# Patient Record
Sex: MALE | Race: WHITE | NOT HISPANIC OR LATINO | Employment: STUDENT | URBAN - METROPOLITAN AREA
[De-identification: names, ages, dates, MRNs, and addresses within clinical notes are randomized per-mention and may not be internally consistent; named-entity substitution may affect disease eponyms.]

---
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
## 2023-01-05 ENCOUNTER — OFFICE VISIT (OUTPATIENT)
Dept: FAMILY MEDICINE CLINIC | Facility: CLINIC | Age: 18
End: 2023-01-05

## 2023-01-05 VITALS
DIASTOLIC BLOOD PRESSURE: 68 MMHG | BODY MASS INDEX: 32.5 KG/M2 | SYSTOLIC BLOOD PRESSURE: 130 MMHG | TEMPERATURE: 97.8 F | HEIGHT: 69 IN | OXYGEN SATURATION: 97 % | HEART RATE: 89 BPM | WEIGHT: 219.4 LBS

## 2023-01-05 DIAGNOSIS — J06.9 UPPER RESPIRATORY TRACT INFECTION, UNSPECIFIED TYPE: Primary | ICD-10-CM

## 2023-01-05 NOTE — PROGRESS NOTES
Assessment/Plan:     Diagnoses and all orders for this visit:    Upper respiratory tract infection, unspecified type  Comments: Will self isolate  COVID test sent to lab  Over-the-counter supportive care  Orders:  -     Covid/Flu- Office Collect          Subjective:      Patient ID: Savanna Grandchild is a 21 y o  male  Patient presents in the office with upper respiratory symptoms since Tuesday  He has cough and congestion  Patient states his chest hurts from coughing  Also has a sore throat  May have had some low-grade fever  Body aches  Nausea or vomiting  Taking over-the-counter cough and cold preps      The following portions of the patient's history were reviewed and updated as appropriate:   He There are no problems to display for this patient  No current outpatient medications on file  No current facility-administered medications for this visit  He has No Known Allergies       Review of Systems   Constitutional: Negative for activity change, appetite change, chills, fatigue and fever  HENT: Positive for congestion and sore throat  Negative for ear pain, postnasal drip, rhinorrhea, sinus pressure, sinus pain and sneezing  Respiratory: Positive for cough  Cardiovascular: Positive for chest pain  Gastrointestinal: Negative for nausea and vomiting  Objective:        Physical Exam  Vitals and nursing note reviewed  Constitutional:       General: He is not in acute distress  Appearance: He is not diaphoretic  HENT:      Head: Normocephalic and atraumatic  Right Ear: Tympanic membrane, ear canal and external ear normal       Left Ear: Tympanic membrane, ear canal and external ear normal       Nose: No rhinorrhea  Right Sinus: No maxillary sinus tenderness or frontal sinus tenderness  Left Sinus: No maxillary sinus tenderness or frontal sinus tenderness  Mouth/Throat:      Pharynx: Posterior oropharyngeal erythema present  No oropharyngeal exudate     Eyes: Conjunctiva/sclera: Conjunctivae normal       Pupils: Pupils are equal, round, and reactive to light  Cardiovascular:      Rate and Rhythm: Normal rate and regular rhythm  Heart sounds: No murmur heard  Pulmonary:      Effort: Pulmonary effort is normal  No respiratory distress  Breath sounds: Normal breath sounds  No wheezing or rales  Lymphadenopathy:      Cervical: No cervical adenopathy  Neurological:      General: No focal deficit present  Mental Status: He is alert and oriented to person, place, and time  Psychiatric:         Mood and Affect: Mood normal          Behavior: Behavior normal          Thought Content:  Thought content normal          Judgment: Judgment normal

## 2023-01-05 NOTE — LETTER
January 5, 2023     Patient: Yamil Walker  YOB: 2005  Date of Visit: 1/5/2023      To Whom it May Concern:    Yamil Walker is under my professional care  Arvind Ajithgodwin was seen in my office on 1/5/2023  Arvind Doyle may return to work on 1/6/2023  If you have any questions or concerns, please don't hesitate to call           Sincerely,          Sandoval Holloway PA-C        CC: No Recipients

## 2023-01-06 LAB
FLUAV RNA RESP QL NAA+PROBE: POSITIVE
FLUBV RNA RESP QL NAA+PROBE: NEGATIVE
SARS-COV-2 RNA RESP QL NAA+PROBE: NEGATIVE

## 2023-02-13 ENCOUNTER — APPOINTMENT (OUTPATIENT)
Dept: RADIOLOGY | Facility: MEDICAL CENTER | Age: 18
End: 2023-02-13

## 2023-02-13 ENCOUNTER — OFFICE VISIT (OUTPATIENT)
Dept: URGENT CARE | Facility: MEDICAL CENTER | Age: 18
End: 2023-02-13

## 2023-02-13 VITALS
TEMPERATURE: 97.7 F | HEART RATE: 74 BPM | BODY MASS INDEX: 32.44 KG/M2 | RESPIRATION RATE: 19 BRPM | SYSTOLIC BLOOD PRESSURE: 112 MMHG | WEIGHT: 219 LBS | DIASTOLIC BLOOD PRESSURE: 62 MMHG | OXYGEN SATURATION: 98 % | HEIGHT: 69 IN

## 2023-02-13 DIAGNOSIS — S93.401A SPRAIN OF RIGHT ANKLE, UNSPECIFIED LIGAMENT, INITIAL ENCOUNTER: Primary | ICD-10-CM

## 2023-02-13 DIAGNOSIS — S99.911A RIGHT ANKLE INJURY, INITIAL ENCOUNTER: ICD-10-CM

## 2023-02-13 NOTE — PROGRESS NOTES
[email or web address]        NAME: Aminta Keith is a 21 y o  male  : 2005    MRN: [id number]  DATE: 2023  TIME: 11:44 AM    Assessment and Plan   Sprain of right ankle, unspecified ligament, initial encounter [S93 401A]  1  Sprain of right ankle, unspecified ligament, initial encounter  XR ankle 3+ vw right            Patient Instructions     1  Over-the-counter ibuprofen 600 to 800 milligrams every 6-8 hours or acetaminophen 975 milligrams every 4-6 hours as needed for pain  2  Ice and elevate the injured area 4 times daily for 20-30 minutes for the next 3-4 days  After that convert to heat in the same regimen  3  Perform the ankle rehab exercises as directed  4  Follow-up with orthopedics in 10-14 days for any persistent symptoms  Chief Complaint     Chief Complaint   Patient presents with   • Ankle Injury     Twisted right ankle yesterday afternoon, swelling noted - has been using ice and compression         History of Present Illness       26-year-old male patient with a 24-hour history of right lateral ankle pain and swelling after miss stepping going down a flight of steps and forcefully inverting his right foot  Patient states has been able to ambulate since the injury but has persistent pain and swelling is noted  He denies any foot or proximal lower leg pain  Denies any paresthesias  Review of Systems   Review of Systems   Constitutional: Negative for chills and fever  HENT: Negative for ear pain and sore throat  Eyes: Negative for pain and visual disturbance  Respiratory: Negative for cough and shortness of breath  Cardiovascular: Negative for chest pain and palpitations  Gastrointestinal: Negative for abdominal pain and vomiting  Genitourinary: Negative for dysuria and hematuria  Musculoskeletal: Positive for arthralgias and joint swelling  Negative for back pain  Skin: Positive for color change  Negative for rash     Neurological: Negative for seizures and syncope  All other systems reviewed and are negative  Current Medications     No current outpatient medications on file  Current Allergies     Allergies as of 02/13/2023   • (No Known Allergies)            The following portions of the patient's history were reviewed and updated as appropriate: allergies, current medications, past family history, past medical history, past social history, past surgical history and problem list      No past medical history on file  No past surgical history on file  Family History   Problem Relation Age of Onset   • No Known Problems Mother    • No Known Problems Father          Medications have been verified  Objective   /62   Pulse 74   Temp 97 7 °F (36 5 °C) (Temporal)   Resp 19   Ht 5' 9" (1 753 m)   Wt 99 3 kg (219 lb)   SpO2 98%   BMI 32 34 kg/m²        Physical Exam     Physical Exam  Vitals and nursing note reviewed  Constitutional:       Appearance: Normal appearance  HENT:      Head: Normocephalic  Nose: Nose normal       Mouth/Throat:      Mouth: Mucous membranes are moist       Pharynx: Oropharynx is clear  Eyes:      Conjunctiva/sclera: Conjunctivae normal       Pupils: Pupils are equal, round, and reactive to light  Cardiovascular:      Rate and Rhythm: Normal rate and regular rhythm  Pulses: Normal pulses  Pulmonary:      Effort: Pulmonary effort is normal       Breath sounds: Normal breath sounds  Abdominal:      Tenderness: There is no abdominal tenderness  Musculoskeletal:      Cervical back: Normal range of motion and neck supple  Comments: Right ankle is tender directly over the lateral malleolus  Swelling to the same area  Dorsiflexion is limited but all other ranges of motion are intact albeit with pain  Anderson's test is negative  Ankle drawer's is negative  Distal neurovascular exam is intact  No tenderness to the proximal lower leg  Skin:     General: Skin is warm and dry  Capillary Refill: Capillary refill takes less than 2 seconds  Neurological:      Mental Status: He is alert and oriented to person, place, and time  Psychiatric:         Mood and Affect: Mood normal          Behavior: Behavior normal          Preliminary interpretation of right ankle x-rays:  No acute abnormality seen

## 2023-02-13 NOTE — PATIENT INSTRUCTIONS
1  Over-the-counter ibuprofen 600 to 800 milligrams every 6-8 hours or acetaminophen 975 milligrams every 4-6 hours as needed for pain  2  Ice and elevate the injured area 4 times daily for 20-30 minutes for the next 3-4 days  After that convert to heat in the same regimen  3  Perform the ankle rehab exercises as directed  4  Follow-up with orthopedics in 10-14 days for any persistent symptoms